# Patient Record
Sex: FEMALE | Race: WHITE | Employment: FULL TIME | URBAN - METROPOLITAN AREA
[De-identification: names, ages, dates, MRNs, and addresses within clinical notes are randomized per-mention and may not be internally consistent; named-entity substitution may affect disease eponyms.]

---

## 2020-06-10 ENCOUNTER — HOSPITAL ENCOUNTER (EMERGENCY)
Age: 40
Discharge: HOME | End: 2020-06-10
Attending: EMERGENCY MEDICINE
Payer: COMMERCIAL

## 2020-06-10 ENCOUNTER — APPOINTMENT (OUTPATIENT)
Dept: GENERAL RADIOLOGY | Age: 40
End: 2020-06-10
Payer: COMMERCIAL

## 2020-06-10 VITALS
SYSTOLIC BLOOD PRESSURE: 107 MMHG | OXYGEN SATURATION: 98 % | DIASTOLIC BLOOD PRESSURE: 80 MMHG | RESPIRATION RATE: 18 BRPM | WEIGHT: 160 LBS | TEMPERATURE: 97.7 F | HEIGHT: 67 IN | HEART RATE: 89 BPM | BODY MASS INDEX: 25.11 KG/M2

## 2020-06-10 PROCEDURE — 73030 X-RAY EXAM OF SHOULDER: CPT

## 2020-06-10 PROCEDURE — 99283 EMERGENCY DEPT VISIT LOW MDM: CPT

## 2020-06-10 RX ORDER — HYDROCODONE BITARTRATE AND ACETAMINOPHEN 5; 325 MG/1; MG/1
1 TABLET ORAL EVERY 8 HOURS PRN
Qty: 12 TABLET | Refills: 0 | Status: SHIPPED | OUTPATIENT
Start: 2020-06-10 | End: 2020-06-15

## 2020-06-10 RX ORDER — ONDANSETRON 4 MG/1
4 TABLET, ORALLY DISINTEGRATING ORAL EVERY 8 HOURS PRN
Qty: 20 TABLET | Refills: 0 | Status: SHIPPED | OUTPATIENT
Start: 2020-06-10

## 2020-06-10 ASSESSMENT — ENCOUNTER SYMPTOMS
FACIAL SWELLING: 0
SHORTNESS OF BREATH: 0
VOICE CHANGE: 0
VOMITING: 0
NAUSEA: 0
TROUBLE SWALLOWING: 0
ABDOMINAL PAIN: 0
BACK PAIN: 0
COLOR CHANGE: 1

## 2020-06-10 ASSESSMENT — PAIN DESCRIPTION - PAIN TYPE: TYPE: ACUTE PAIN

## 2020-06-10 ASSESSMENT — PAIN DESCRIPTION - LOCATION: LOCATION: SHOULDER

## 2020-06-10 ASSESSMENT — PAIN DESCRIPTION - FREQUENCY: FREQUENCY: CONTINUOUS

## 2020-06-10 ASSESSMENT — PAIN SCALES - GENERAL: PAINLEVEL_OUTOF10: 7

## 2020-06-10 ASSESSMENT — PAIN DESCRIPTION - ORIENTATION: ORIENTATION: RIGHT

## 2020-06-10 ASSESSMENT — PAIN DESCRIPTION - DESCRIPTORS: DESCRIPTORS: ACHING;THROBBING
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
## 2020-06-19 ENCOUNTER — TELEPHONE (OUTPATIENT)
Dept: PSYCHIATRY | Age: 40
End: 2020-06-19

## 2020-06-19 NOTE — TELEPHONE ENCOUNTER
[facility] [facility] clinician received a VM from patient requesting [facility] services. Clinician returned patient's call and left VM.

## 2020-06-22 ENCOUNTER — TELEPHONE (OUTPATIENT)
Dept: PSYCHIATRY | Age: 40
End: 2020-06-22

## 2020-06-26 ENCOUNTER — HOSPITAL ENCOUNTER (OUTPATIENT)
Dept: PSYCHIATRY | Age: 40
Setting detail: THERAPIES SERIES
Discharge: HOME | End: 2020-06-26

## 2020-06-26 ASSESSMENT — PATIENT HEALTH QUESTIONNAIRE - PHQ9
SUM OF ALL RESPONSES TO PHQ QUESTIONS 1-9: 7
SUM OF ALL RESPONSES TO PHQ QUESTIONS 1-9: 7
SUM OF ALL RESPONSES TO PHQ9 QUESTIONS 1 & 2: 1
6. FEELING BAD ABOUT YOURSELF - OR THAT YOU ARE A FAILURE OR HAVE LET YOURSELF OR YOUR FAMILY DOWN: 2
3. TROUBLE FALLING OR STAYING ASLEEP: 3
4. FEELING TIRED OR HAVING LITTLE ENERGY: 0
10. IF YOU CHECKED OFF ANY PROBLEMS, HOW DIFFICULT HAVE THESE PROBLEMS MADE IT FOR YOU TO DO YOUR WORK, TAKE CARE OF THINGS AT HOME, OR GET ALONG WITH OTHER PEOPLE: 3
1. LITTLE INTEREST OR PLEASURE IN DOING THINGS: 0
8. MOVING OR SPEAKING SO SLOWLY THAT OTHER PEOPLE COULD HAVE NOTICED. OR THE OPPOSITE, BEING SO FIGETY OR RESTLESS THAT YOU HAVE BEEN MOVING AROUND A LOT MORE THAN USUAL: 0
2. FEELING DOWN, DEPRESSED OR HOPELESS: 1
9. THOUGHTS THAT YOU WOULD BE BETTER OFF DEAD, OR OF HURTING YOURSELF: 0
5. POOR APPETITE OR OVEREATING: 0
7. TROUBLE CONCENTRATING ON THINGS, SUCH AS READING THE NEWSPAPER OR WATCHING TELEVISION: 1
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
## 2020-07-03 ENCOUNTER — HOSPITAL ENCOUNTER (OUTPATIENT)
Dept: PSYCHIATRY | Age: 40
Setting detail: THERAPIES SERIES
Discharge: HOME | End: 2020-07-03
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
## 2020-07-09 ENCOUNTER — HOSPITAL ENCOUNTER (OUTPATIENT)
Dept: PSYCHIATRY | Age: 40
Setting detail: THERAPIES SERIES
Discharge: HOME | End: 2020-07-09

## 2020-07-09 NOTE — PROGRESS NOTES
TELEHEALTH EVALUATION -- Audio/Visual (During QZLJW-30 public health emergency)     [address] [facility] Therapy Note  Camarena CHEPE Rodríguez, LSW   7/9/2020  3:22 PM  Giovana Ojeda  1980  [id number]    Time spent with Patient: 30 minutes      Pt provided informed consent for the [address] [facility]. Discussed with patient model of service to include the limits of confidentiality (i.e. abuse reporting, suicide intervention, etc.) and short-term intervention focused approach. Pt indicated understanding. S:   Patient participated in session via tele-health, she was observed to me more quiet than pervious sessions. O:  MSE:     Appearance    Unable to assess via tele-health  Appetite normal  Sleep disturbance No  Fatigue Yes  Loss of pleasure No  Impulsive behavior No  Speech    slow  Mood    Anxious  Affect    normal affect  Thought Content    intact  Thought Process    linear  Associations    logical connections  Insight    Unable to Assess  Judgment    Intact  Orientation    oriented to person, place, time, and general circumstances  Memory    recent and remote memory intact  Attention/Concentration    intact  Morbid ideation No  Suicide Assessment    no suicidal ideation    A:   Patient was quiet today and discussed being very distracted by other life events and hasn't been thinking about the current situation. She reports that she kept busy with her kids and friends on the anniversary and it wasn't as hard as she thought. Discuss how her doctor appointment for her broke arm went (its healing good). Diagnosis:  PTSD  There were no encounter diagnoses. No past medical history on file. History:    Medications:   Current Outpatient Medications   Medication Sig Dispense Refill    ondansetron (ZOFRAN ODT) 4 MG disintegrating tablet Take 1 tablet by mouth every 8 hours as needed for Nausea or Vomiting 20 tablet 0     No current facility-administered medications for this encounter.         Social History:   Social History     Socioeconomic History    Marital status: Single     Spouse name: Not on file    Number of children: Not on file    Years of education: Not on file    Highest education level: Not on file   Occupational History    Not on file   Social Needs    Financial resource strain: Not on file    Food insecurity     Worry: Not on file     Inability: Not on file    Transportation needs     Medical: Not on file     Non-medical: Not on file   Tobacco Use    Smoking status: Not on file   Substance and Sexual Activity    Alcohol use: Not on file    Drug use: Not on file    Sexual activity: Not on file   Lifestyle    Physical activity     Days per week: Not on file     Minutes per session: Not on file    Stress: Not on file   Relationships    Social connections     Talks on phone: Not on file     Gets together: Not on file     Attends Gnosticism service: Not on file     Active member of club or organization: Not on file     Attends meetings of clubs or organizations: Not on file     Relationship status: Not on file    Intimate partner violence     Fear of current or ex partner: Not on file     Emotionally abused: Not on file     Physically abused: Not on file     Forced sexual activity: Not on file   Other Topics Concern    Not on file   Social History Narrative    Not on file       TOBACCO:   has no history on file for tobacco.  ETOH:   has no history on file for alcohol. Family History:   No family history on file. Plan:continue counseling, will miss next week due to therapist being off but was given the [facility] number in case she needs to talk while therapist is away.      Pt interventions:  Discussed prevalence of  anxiety and stress for general population and Provided education      Pt Behavioral Change Plan:    Pursuant to the emergency declaration under the [facility] waiver authority and the [employer]ropriations Act, this Virtual Visit was conducted, with patient's consent, to reduce the patient's risk of exposure to COVID-19 and provide continuity of care for an established patient. Services were provided through a video synchronous discussion virtually to substitute for in-person clinic visit.

## 2020-07-24 ENCOUNTER — HOSPITAL ENCOUNTER (OUTPATIENT)
Dept: PSYCHIATRY | Age: 40
Setting detail: THERAPIES SERIES
Discharge: HOME | End: 2020-07-24

## 2020-07-31 ENCOUNTER — HOSPITAL ENCOUNTER (OUTPATIENT)
Dept: PSYCHIATRY | Age: 40
Setting detail: THERAPIES SERIES
Discharge: HOME | End: 2020-07-31

## 2020-07-31 NOTE — PROGRESS NOTES
Pursuant to the emergency declaration under the [address] [place], [place] [address] [facility] authority and the [employer] and [employer] Act, this Virtual Visit was conducted, with patient's consent, to reduce the patient's risk of exposure to COVID-19 and provide continuity of care for an established patient. Services were provided through a video synchronous discussion virtually to substitute for in-person clinic visit. Maximibrahima Cosmekaushik 58, MSW, [place]   7/31/2020  11:02 AM  Giovana Ojeda  1980  [id number]    Diagnosis: PTSD    Please Indicate where at in treatment:    Just Beginning Treatment:  [x]Yes [] NO     Review of treatment:   []YES []NO              Are you extending sessions? []YES []NO       How many? Problem to address     Goal Plan Progress Session Goal Met? Depression, anxiety, poor interpersonal skills with  Develop effective, healthy communication skills. Weekly therapy sessions. Depression, anxiety Increase self-worth, and increase coping skills. Weekly therapy sessions. Patient reviewed and agrees to plan:  Yes via tele-health. Patient signature if providing them with copy:_____________________________________________________________________    Pursuant to the emergency declaration under the [address] [place] [address] authority and the [employer] and [employer] Act, this Virtual Visit was conducted, with patient's consent, to reduce the patient's risk of exposure to COVID-19 and provide continuity of care for an established patient. Services were provided through a video synchronous discussion virtually to substitute for in-person clinic visit.
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
## 2020-08-14 ENCOUNTER — HOSPITAL ENCOUNTER (OUTPATIENT)
Dept: PSYCHIATRY | Age: 40
Setting detail: THERAPIES SERIES
Discharge: HOME | End: 2020-08-14

## 2020-08-14 NOTE — PROGRESS NOTES
TELEHEALTH EVALUATION -- Audio/Visual (During XVOAY-11 public health emergency)     [address] [facility] [facility] Therapy Note  [place], [place]   8/14/2020  11:08am  Giovana Ojeda  1980  [id number]    Time spent with Patient: 54 minutes      Pt provided informed consent for the [address] [facility]. Discussed with patient model of service to include the limits of confidentiality (i.e. abuse reporting, suicide intervention, etc.) and short-term intervention focused approach. Pt indicated understanding. S:   Patient was engaged and participated in session. O:  MSE:     Appearance    cooperative  Appetite normal  Sleep disturbance No  Fatigue No  Loss of pleasure No  Impulsive behavior No  Speech    normal rate and normal volume  Mood    Hopeful   Affect    normal affectnormal affect  Thought Content    intact  Thought Process    goal directed and coherent  Associations    logical connections  Insight    Fair  Judgment    Intact  Orientation    oriented to person, place, time, and general circumstances  Memory    recent and remote memory intact  Attention/Concentration    intact  Morbid ideation No  Suicide Assessment    no suicidal ideation    A:   Patient denies depression symptoms or suicidal ideation. Patient expressed some anxiety thinking about returning to work in September. Discussed the stressors and what coping patient will utilize going back to work and co-workers attitudes/judgements. History:    Medications:   Current Outpatient Medications   Medication Sig Dispense Refill    ondansetron (ZOFRAN ODT) 4 MG disintegrating tablet Take 1 tablet by mouth every 8 hours as needed for Nausea or Vomiting 20 tablet 0     No current facility-administered medications for this encounter.         Social History:   Social History     Socioeconomic History    Marital status: Single     Spouse name: Not on file    Number of children: Not on file    Years of education: Not on file    Highest education level: Not on file   Occupational History    Not on file   Social Needs    Financial resource strain: Not on file    Food insecurity     Worry: Not on file     Inability: Not on file    Transportation needs     Medical: Not on file     Non-medical: Not on file   Tobacco Use    Smoking status: Not on file   Substance and Sexual Activity    Alcohol use: Not on file    Drug use: Not on file    Sexual activity: Not on file   Lifestyle    Physical activity     Days per week: Not on file     Minutes per session: Not on file    Stress: Not on file   Relationships    Social connections     Talks on phone: Not on file     Gets together: Not on file     Attends [employer] service: Not on file     Active member of club or organization: Not on file     Attends meetings of clubs or organizations: Not on file     Relationship status: Not on file    Intimate partner violence     Fear of current or ex partner: Not on file     Emotionally abused: Not on file     Physically abused: Not on file     Forced sexual activity: Not on file   Other Topics Concern    Not on file   Social History Narrative    Not on file       TOBACCO:   has no history on file for tobacco.  ETOH:   has no history on file for alcohol. Family History:   No family history on file. Diagnosis:  There were no encounter diagnoses. No past medical history on file. Pt interventions:  Discussed prevalence of  stress for general population, Trained in improving communication skills and Provided education        Were changes or additions made to the treatment plan today?   YES []   NO [x]      TREATMENT PLAN    8/14/2020      Problem or Issue Identified:Depression, anxiety, poor interpersonal skills with     Goal: Develop effective, healthy communication skills and Increase self-worth, and increase coping skills    Plan: CBT, psychoeducation, modeling, role-playing, DBT      Progress on Goal    8/14/2020    Goal Met: YES []   NO []    Extending More Sessions:  YES []  NO []                  Pursuant to the emergency declaration under the [facility] waiver authority and the [employer] and [employer] Act, this Virtual Visit was conducted, with patient's consent, to reduce the patient's risk of exposure to COVID-19 and provide continuity of care for an established patient. Services were provided through a video synchronous discussion virtually to substitute for in-person clinic visit.

## 2020-08-28 ENCOUNTER — HOSPITAL ENCOUNTER (OUTPATIENT)
Dept: PSYCHIATRY | Age: 40
Setting detail: THERAPIES SERIES
Discharge: HOME | End: 2020-08-28
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
